# Patient Record
Sex: MALE | Race: WHITE | Employment: OTHER | URBAN - METROPOLITAN AREA
[De-identification: names, ages, dates, MRNs, and addresses within clinical notes are randomized per-mention and may not be internally consistent; named-entity substitution may affect disease eponyms.]

---

## 2021-02-08 ENCOUNTER — HOSPITAL ENCOUNTER (OUTPATIENT)
Dept: PHYSICAL THERAPY | Age: 56
Discharge: HOME | End: 2021-02-08
Payer: COMMERCIAL

## 2021-02-08 PROCEDURE — 97162 PT EVAL MOD COMPLEX 30 MIN: CPT

## 2021-02-08 PROCEDURE — 97110 THERAPEUTIC EXERCISES: CPT
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
## 2021-02-15 ENCOUNTER — HOSPITAL ENCOUNTER (OUTPATIENT)
Dept: PHYSICAL THERAPY | Age: 56
End: 2021-02-15
Payer: COMMERCIAL

## 2021-02-19 ENCOUNTER — HOSPITAL ENCOUNTER (OUTPATIENT)
Dept: PHYSICAL THERAPY | Age: 56
Discharge: HOME | End: 2021-02-19
Payer: COMMERCIAL

## 2021-02-19 PROCEDURE — 97014 ELECTRIC STIMULATION THERAPY: CPT

## 2021-02-19 PROCEDURE — 97110 THERAPEUTIC EXERCISES: CPT

## 2021-02-19 NOTE — PROGRESS NOTES
PHYSICAL THERAPY - DAILY TREATMENT NOTE    Patient Name: Syeda Beltran        Date: 2021  : 1965   YES Patient  Verified  Visit #:     Insurance: Payor: Lori Mao / Plan: Willard Oliver / Product Type: HMO /      In time: 9:30 A Out time: 10:25 A   Total Treatment Time: 50     BCBS/Medicare Time Tracking (below)   Total Timed Codes (min):  50 1:1 Treatment Time:  50     TREATMENT AREA = Neck pain [M54.2]  Left shoulder pain [M25.512]    SUBJECTIVE  Pain Level (on 0 to 10 scale):  8  / 10   Medication Changes/New allergies or changes in medical history, any new surgeries or procedures? NO    If yes, update Summary List   Subjective Functional Status/Changes:  []  No changes reported     Patient reports he has a headache today, it is daily but a little less in intensity. He is waiting on both X-ray& CT scan. His spine doctor ordered the CT scan.           Modalities Rationale:     decrease edema, decrease inflammation and decrease pain to improve patient's ability to return to pain-free ADLs  15 min [x] Estim, type/location: IFC to c/s in supine                                    []  att     [x]  unatt     []  w/US     []  w/ice    [x]  w/heat    min []  Mechanical Traction: type/lbs                   []  pro   []  sup   []  int   []  cont    []  before manual    []  after manual    min []  Ultrasound, settings/location:      min []  Iontophoresis w/ dexamethasone, location:                                               []  take home patch       []  in clinic    min []  Ice     []  Heat    location/position:     min []  Vasopneumatic Device, press/temp:     min []  Other:    [] Skin assessment post-treatment (if applicable):    [x]  intact    [x]  redness- no adverse reaction     []redness  adverse reaction:        35 min Therapeutic Exercise:  [x]  See flow sheet   Rationale:      increase ROM and increase strength to improve the patients ability to return to pain-free c/s AROM with driving      Billed With/As:   [] TE   [] TA   [] Neuro   [] Self Care Patient Education: [x] Review HEP    [] Progressed/Changed HEP based on:   [] positioning   [] body mechanics   [] transfers   [] heat/ice application    [] other:      Other Objective/Functional Measures: Added c/s AROM for rotation in supine, posterior capsule stretch on L & dowel AAROM for L shoulder (see updated HEP)     Post Treatment Pain Level (on 0 to 10) scale:   7  / 10     ASSESSMENT  Assessment/Changes in Function:     Difficulty with bed mobility such as rolling, improved tolerance with rolling to R for supine to sit transfers     []  See Progress Note/Recertification   Patient will continue to benefit from skilled PT services to modify and progress therapeutic interventions, address functional mobility deficits, address ROM deficits, address strength deficits, analyze and address soft tissue restrictions, analyze and cue movement patterns, analyze and modify body mechanics/ergonomics, assess and modify postural abnormalities, address imbalance/dizziness and instruct in home and community integration to attain remaining goals. Progress toward goals / Updated goals:    Progressing towards STG 2     PLAN  []  Upgrade activities as tolerated YES Continue plan of care   []  Discharge due to :    []  Other:      Therapist: Antonio Galvan PT    Date: 2/19/2021 Time: 9:52 AM     No future appointments.

## 2021-02-26 ENCOUNTER — HOSPITAL ENCOUNTER (OUTPATIENT)
Dept: PHYSICAL THERAPY | Age: 56
Discharge: HOME | End: 2021-02-26
Payer: COMMERCIAL

## 2021-02-26 PROCEDURE — 97110 THERAPEUTIC EXERCISES: CPT

## 2021-02-26 PROCEDURE — 97140 MANUAL THERAPY 1/> REGIONS: CPT

## 2021-02-26 PROCEDURE — 97014 ELECTRIC STIMULATION THERAPY: CPT

## 2021-02-26 NOTE — PROGRESS NOTES
PHYSICAL THERAPY - DAILY TREATMENT NOTE    Patient Name: Kathryn Newberry        Date: 2021  : 1965   YES Patient  Verified  Visit #:   3   of   12  Insurance: Payor: Easton Ovalle / Plan: Cj Shi / Product Type: HMO /      In time: 220 Out time: 315p   Total Treatment Time: 55     BCBS/Medicare Time Tracking (below)   Total Timed Codes (min):  55 1:1 Treatment Time:  40     TREATMENT AREA =  Neck pain [M54.2]  Left shoulder pain [M25.512]    SUBJECTIVE  Pain Level (on 0 to 10 scale):  -7  / 10   Medication Changes/New allergies or changes in medical history, any new surgeries or procedures? NO    If yes, update Summary List   Subjective Functional Status/Changes:  []  No changes reported     I feel better, my shoulder hurts most when I reach my arm far forward. Modalities Rationale:     decrease edema, decrease inflammation, decrease pain and increase tissue extensibility to improve patient's ability to perform pian-free ADLS.   15 min [x] Estim, type/location: IFC to c/s in supine                                     []  att     [x]  unatt     []  w/US     []  w/ice    [x]  w/heat    min []  Mechanical Traction: type/lbs                   []  pro   []  sup   []  int   []  cont    []  before manual    []  after manual    min []  Ultrasound, settings/location:      min []  Iontophoresis w/ dexamethasone, location:                                               []  take home patch       []  in clinic    min []  Ice     []  Heat    location/position:     min []  Vasopneumatic Device, press/temp:     min []  Other:    [x] Skin assessment post-treatment (if applicable):    [x]  intact    [x]  redness- no adverse reaction     []redness  adverse reaction:        30 min Therapeutic Exercise:  [x]  See flow sheet   Rationale:      increase ROM, increase strength and improve coordination to improve the patients ability to improve resting posture     10 min Manual Therapy: Gentle SOR, AO flexion, gentle UT stretch   Rationale:      decrease pain, increase ROM and increase tissue extensibility to improve patient's ability to improve seated posture  The manual therapy interventions were performed at a separate and distinct time from the therapeutic activities interventions. Billed With/As:   [x] TE   [] TA   [] Neuro   [] Self Care Patient Education: [x] Review HEP    [] Progressed/Changed HEP based on:   [] positioning   [] body mechanics   [] transfers   [] heat/ice application    [] other:      Other Objective/Functional Measures: Added pulleys, seated chin tuck and seated cervical rotation     Post Treatment Pain Level (on 0 to 10) scale:   5  / 10     ASSESSMENT  Assessment/Changes in Function:     Mild improvements in ROM with chin tuck, improved tolerance to exercise after w/u on pulleys. Cues to prevent UT engagement with [place] activities. []  See Progress Note/Recertification   Patient will continue to benefit from skilled PT services to modify and progress therapeutic interventions, address functional mobility deficits, address ROM deficits, address strength deficits, analyze and address soft tissue restrictions, analyze and cue movement patterns, analyze and modify body mechanics/ergonomics and assess and modify postural abnormalities to attain remaining goals. Progress toward goals / Updated goals:    Progressing towards STG #2.      PLAN  [x]  Upgrade activities as tolerated YES Continue plan of care   []  Discharge due to :    []  Other:      Therapist: Robert Young DPT    Date: 2/26/2021 Time: 2:48 PM     Future Appointments   Date Time Provider [employer] [facility]   3/1/2021  6:00 PM Ron Sharma [facility] [place] [address]   3/3/2021  6:00 PM Ron Sharma PT [facility] [address]   3/8/2021  6:00 PM Ron Sharma PT [facility] [address]   3/10/2021  6:00 PM Ron Sharma PT [facility] [address]   3/15/2021  6:00 PM Ron Sharma PT [facility] [address]   3/17/2021  6:00 PM Ron Sharma PT [facility] [address]

## 2021-03-01 ENCOUNTER — APPOINTMENT (OUTPATIENT)
Dept: PHYSICAL THERAPY | Age: 56
End: 2021-03-01
Payer: COMMERCIAL

## 2021-03-03 ENCOUNTER — HOSPITAL ENCOUNTER (OUTPATIENT)
Dept: PHYSICAL THERAPY | Age: 56
Discharge: HOME | End: 2021-03-03
Payer: COMMERCIAL

## 2021-03-03 PROCEDURE — 97014 ELECTRIC STIMULATION THERAPY: CPT

## 2021-03-03 PROCEDURE — 97110 THERAPEUTIC EXERCISES: CPT

## 2021-03-03 NOTE — PROGRESS NOTES
PHYSICAL THERAPY - DAILY TREATMENT NOTE    Patient Name: John Hu        Date: 3/3/2021  : 1965   YES Patient  Verified  Visit #:     Insurance: Payor: Carolina Fees / Plan: Brendan Olivera / Product Type: HMO /      In time: 315 Out time: 655   Total Treatment Time: 55     BCBS/Medicare Time Tracking (below)   Total Timed Codes (min):  40 1:1 Treatment Time:  40     TREATMENT AREA =  Neck pain [M54.2]  Left shoulder pain [M25.512]    SUBJECTIVE  Pain Level (on 0 to 10 scale):  7  / 10   Medication Changes/New allergies or changes in medical history, any new surgeries or procedures? NO    If yes, update Summary List   Subjective Functional Status/Changes:  []  No changes reported   The headaches are a little better. Marvin Lemon still a lot of tightness in my neck and a full feeling in my L ear. Im trying to move my shoulder more but can raise my arm up all of the way.  I cant turn my head too quickly either        Modalities Rationale:     decrease inflammation, decrease pain and increase tissue extensibility to improve patient's ability to perform ADLs  15 min [x] Estim, type/location: IFC to C/S in supine                                    []  att     [x]  unatt     []  w/US     []  w/ice    [x]  w/heat    min []  Mechanical Traction: type/lbs                   []  pro   []  sup   []  int   []  cont    []  before manual    []  after manual    min []  Ultrasound, settings/location:      min []  Iontophoresis w/ dexamethasone, location:                                               []  take home patch       []  in clinic    min []  Ice     []  Heat    location/position:     min []  Vasopneumatic Device, press/temp:     min []  Other:    [x] Skin assessment post-treatment (if applicable):    [x]  intact    [x]  redness- no adverse reaction     []redness  adverse reaction:        40 min Therapeutic Exercise:  [x]  See flow sheet   Rationale:      increase ROM and increase strength to improve the patients ability to perform unlimited ADLs     Billed With/As:   [] TE   [] TA   [] Neuro   [] Self Care Patient Education: [x] Review HEP    [] Progressed/Changed HEP based on:   [] positioning   [] body mechanics   [] transfers   [] heat/ice application    [x] other:      Other Objective/Functional Measures:    TE per FS, added S/L shoulder ABD and ER   Post Treatment Pain Level (on 0 to 10) scale:   5-6  / 10     ASSESSMENT  Assessment/Changes in Function:     Cueing required to minimize UT recruitment and mm guarding on L during pulleys. Improved tolerance to supine OA nod. C/S AROM remains painfully limited with slow guarded movement but improved tolerance to supine C/S AROM into Rot     []  See Progress Note/Recertification   Patient will continue to benefit from skilled PT services to modify and progress therapeutic interventions, address functional mobility deficits, address ROM deficits, address strength deficits, analyze and address soft tissue restrictions, analyze and cue movement patterns, analyze and modify body mechanics/ergonomics, assess and modify postural abnormalities, address imbalance/dizziness and instruct in home and community integration to attain remaining goals.    Progress toward goals / Updated goals:    Met STG1     PLAN  [x]  Upgrade activities as tolerated YES Continue plan of care   []  Discharge due to :    []  Other:      Therapist: Ovidio Fonseca, PT, DPT, MTC, CMTPT    Date: 3/3/2021 Time: 12:56 PM     Future Appointments   Date Time Provider [employer] [facility]   3/8/2021  6:00 PM Maria E Rawls [facility]   3/10/2021  6:00 PM Maria E Rawls [facility]   3/15/2021  6:00 PM [facility]   3/17/2021  6:00 PM Maria E Rawls PT [facility]

## 2021-03-08 ENCOUNTER — HOSPITAL ENCOUNTER (OUTPATIENT)
Dept: PHYSICAL THERAPY | Age: 56
Discharge: HOME | End: 2021-03-08
Payer: COMMERCIAL

## 2021-03-08 PROCEDURE — 97110 THERAPEUTIC EXERCISES: CPT

## 2021-03-08 NOTE — PROGRESS NOTES
PHYSICAL THERAPY - DAILY TREATMENT NOTE    Patient Name: Barbara Mcelroy        Date: 3/8/2021  : 1965   YES Patient  Verified  Visit #:     Insurance: Payor: Lupis Izquierdo / Plan: Rian Cortez / Product Type: HMO /      In time: 600 Out time: 650   Total Treatment Time: 45     BCBS/Medicare Time Tracking (below)   Total Timed Codes (min):  45 1:1 Treatment Time:  45     TREATMENT AREA =  Neck pain [M54.2]  Left shoulder pain [M25.512]    SUBJECTIVE  Pain Level (on 0 to 10 scale):  6.5   10   Medication Changes/New allergies or changes in medical history, any new surgeries or procedures? NO    If yes, update Summary List   Subjective Functional Status/Changes:  []  No changes reported     im more sore today. I was doing some drywall work at my house.  The mornings are the worst because my shoulder feels so stiff      Modalities Rationale:     decrease inflammation, decrease pain and increase tissue extensibility to improve patient's ability to perform ADLs  PD min [] Estim, type/location:                                     []  att     []  unatt     []  w/US     []  w/ice    []  w/heat    min []  Mechanical Traction: type/lbs                   []  pro   []  sup   []  int   []  cont    []  before manual    []  after manual    min []  Ultrasound, settings/location:      min []  Iontophoresis w/ dexamethasone, location:                                               []  take home patch       []  in clinic    min []  Ice     []  Heat    location/position:     min []  Vasopneumatic Device, press/temp:     min []  Other:    [x] Skin assessment post-treatment (if applicable):    [x]  intact    [x]  redness- no adverse reaction     []redness  adverse reaction:        45 min Therapeutic Exercise:  [x]  See flow sheet   Rationale:      increase ROM and increase strength to improve the patients ability to perform unlimited ADLs     Billed With/As:   [] TE   [] TA   [] Neuro   [] Self Care Patient Education: [x] Review HEP    [] Progressed/Changed HEP based on:   [] positioning   [] body mechanics   [] transfers   [] heat/ice application    [x] other: shoulder flex stretch at wall     Other Objective/Functional Measures:    TE per FS  L shoulder AROM flex 130, abd 105, fxnl IR to L5  C/S flex 50%, ext 75%, Rot L 40%, Rot R 60%   Post Treatment Pain Level (on 0 to 10) scale:   5-6  / 10     ASSESSMENT  Assessment/Changes in Function:     See PN     [x]  See Progress Note/Recertification   Patient will continue to benefit from skilled PT services to modify and progress therapeutic interventions, address functional mobility deficits, address ROM deficits, address strength deficits, analyze and address soft tissue restrictions, analyze and cue movement patterns, analyze and modify body mechanics/ergonomics, assess and modify postural abnormalities, address imbalance/dizziness and instruct in home and community integration to attain remaining goals.    Progress toward goals / Updated goals:    See PN     PLAN  [x]  Upgrade activities as tolerated YES Continue plan of care   []  Discharge due to :    []  Other:      Therapist: Evon De Santiago PT, DPT, MTC, CMTPT    Date: 3/8/2021 Time: 12:56 PM     Future Appointments   Date Time Provider [employer]   3/10/2021  6:00 PM Lilibeth Oneil [facility]   3/15/2021  6:00 PM Naga Cabrera PT [facility]   3/17/2021  6:00 PM Naga Cabrera PT [facility]

## 2021-03-09 NOTE — PROGRESS NOTES
[facility] [facility] PHYSICAL THERAPY  [address], [place], [address] -   Phone: [phone]  Fax: [phone]  [x]  PROGRESS NOTE  []  DISCHARGE SUMMARY  Patient Name: Norma Horn : 1965   Treatment Diagnosis: Neck pain [M54.2]  Left shoulder pain [M25.512]     Referral Source: Denny Dillon MD     Date of Initial Visit: 21 Attended Visits: 5 Missed Visits: 2     SUMMARY OF TREATMENT  Therapeutic exercises including ROM, strengthening and stretching; manual therapy including joint and soft tissue manipulation; gait and balance training, postural re-education, modalities: MHP and e-stim, HEP instruction. CURRENT STATUS  Patient is a 60 y.o. male who presents to [facility] with diagnosis of C/S and L shoulder pain. He has made slow but steady progress in PT. C/S and L shoulder ROM have improved but remain limited. Fear of movement and mm guarding persist, mosty during cervical AROM into rotation although ROM has improved. He reports 50% improvement in headaches, noting they are no longer constant and have significantly decreased in intensity. See below for further objective measures. Goal/Measure of Progress Goal Met? 1. Patient to be independent and compliant with initial HEP to prevent further disability. Status at last Eval: Established Current Status: I with HEP yes   2. Restore C/S AROM to 50% in all planes so ROM is available for driving. Status at last Eval: Flex 25% p! Ext 75%  B SB/Rot 25% with L ERP and mm guarding  Current Status: Flex 50%  Ext 75%  Rot L 40%  Rot R 60% progressing   3. Patient will report decreased c/o pain to < or = 3/10 to facilitate ease with sleeping with manageable sx in C/S. Status at last Eval: 8/10 Current Status: 6.5/10 progressing   4. Restore L shoulder AROM to [facility] so ROM is available for  reaching and dressing.    Status at last Eval: flex 110°  abd 90° Current Status: flex 130°  abd 105° progressing     New Goals to be achieved in __3-4__  weeks:  1. Patient to be independent & compliant with a progressive, high level HEP in order to maintain gains made in physical therapy. 2.  Improve FOTO score to 57 indicating significant functional improvement in order to return to PLOF. 3.  Patient to remain HA free for 1 week to allow for uninterrupted ADL performance. 4. STG 2-4     RECOMMENDATIONS  Continue therapy per initial plan/protocol    Specifics: The patient could benefit from continued PT 2-3x/week for 3-4 weeks to progress towards achieving all LTGs. If you have any questions/comments please contact us directly at [phone]. Thank you for allowing us to assist in the care of your patient. Therapist Signature: Abdiel Suero, PT, DPT, MTC, CMTPT Date: 3/9/2021   Reporting Period: NA Time: 1:39 PM   NOTE TO PHYSICIAN:  PLEASE COMPLETE THE ORDERS BELOW AND FAX TO   [facility]: [phone]  If you are unable to process this request in 24 hours please contact our office: [phone]    ___ I have read the above report and request that my patient continue as recommended.   ___ I have read the above report and request that my patient continue therapy with the following changes/special instructions:_________________________________________________________   ___ I have read the above report and request that my patient be discharged from therapy.      Physician Signature:        Date:       Time:         Andrei Luque MD

## 2021-03-10 ENCOUNTER — APPOINTMENT (OUTPATIENT)
Dept: PHYSICAL THERAPY | Age: 56
End: 2021-03-10
Payer: COMMERCIAL

## 2021-03-18 NOTE — PROGRESS NOTES
[address] [facility] PHYSICAL THERAPY AT [address] [place], [place] [address] [place] [facility], [address] ,[address], [address]  Phone: [phone]  Fax: [phone]  DISCHARGE SUMMARY  Patient Name: Syeda Beltran : 1965   Treatment/Medical Diagnosis: Neck pain [M54.2]  Left shoulder pain [M25.512]   Referral Source: Alphonso Jeter MD     Date of Initial Visit: 21 Attended Visits: 5 Missed Visits: 4     SUMMARY OF TREATMENT  Therapeutic exercises including ROM, strengthening and stretching; postural re-education, modalities: MHP and e-stim, HEP instruction. CURRENT STATUS  Patient is a 60 y.o. male who presents to [facility] with diagnosis of C/S and L shoulder pain. He has not attended his last 3 scheduled visits and therefore current status is unknown. At his last attended appt on 3/8/21 he had made minimal progress in PT, reporting dec HA frequency and intensity but 6-7/10 pain. Both cervical and L shoulder AROM have improved but remain limited due to pain, muscle guarding and fear of movement. Please refer to progress note faxed on 3/8/21 for formal reassessment performed at last attended visit on 3/8/21. RECOMMENDATIONS  Discontinue therapy due to lack of attendance or compliance. If you have any questions/comments please contact us directly at [phone]. Thank you for allowing us to assist in the care of your patient.     Therapist Signature: Phylliss Dance, PT, DPT, MTC, CMTPT Date: 3/18/2021     Time: 8:43 AM